# Patient Record
Sex: MALE | Race: WHITE | NOT HISPANIC OR LATINO | URBAN - METROPOLITAN AREA
[De-identification: names, ages, dates, MRNs, and addresses within clinical notes are randomized per-mention and may not be internally consistent; named-entity substitution may affect disease eponyms.]

---

## 2019-10-24 ENCOUNTER — EMERGENCY (EMERGENCY)
Facility: HOSPITAL | Age: 64
LOS: 0 days | Discharge: HOME | End: 2019-10-24
Attending: EMERGENCY MEDICINE | Admitting: EMERGENCY MEDICINE
Payer: MEDICARE

## 2019-10-24 ENCOUNTER — TRANSCRIPTION ENCOUNTER (OUTPATIENT)
Age: 64
End: 2019-10-24

## 2019-10-24 VITALS
OXYGEN SATURATION: 99 % | HEART RATE: 68 BPM | SYSTOLIC BLOOD PRESSURE: 164 MMHG | RESPIRATION RATE: 17 BRPM | TEMPERATURE: 99 F | DIASTOLIC BLOOD PRESSURE: 92 MMHG

## 2019-10-24 VITALS
TEMPERATURE: 97 F | SYSTOLIC BLOOD PRESSURE: 190 MMHG | OXYGEN SATURATION: 97 % | RESPIRATION RATE: 18 BRPM | HEART RATE: 77 BPM | DIASTOLIC BLOOD PRESSURE: 89 MMHG

## 2019-10-24 DIAGNOSIS — S61.211A LACERATION WITHOUT FOREIGN BODY OF LEFT INDEX FINGER WITHOUT DAMAGE TO NAIL, INITIAL ENCOUNTER: ICD-10-CM
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
## 2019-10-24 DIAGNOSIS — M79.646 PAIN IN UNSPECIFIED FINGER(S): ICD-10-CM
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
## 2019-10-24 PROCEDURE — 73140 X-RAY EXAM OF FINGER(S): CPT | Mod: 26,LT

## 2019-10-24 PROCEDURE — 99283 EMERGENCY DEPT VISIT LOW MDM: CPT

## 2019-10-24 RX ORDER — CEPHALEXIN 500 MG
500 CAPSULE ORAL EVERY 12 HOURS
Refills: 0 | Status: DISCONTINUED | OUTPATIENT
Start: 2019-10-24 | End: 2019-10-24

## 2019-10-24 RX ORDER — CEPHALEXIN 500 MG
1 CAPSULE ORAL
Qty: 40 | Refills: 0
Start: 2019-10-24 | End: 2019-11-02

## 2019-10-24 RX ORDER — CEFAZOLIN SODIUM 1 G
2000 VIAL (EA) INJECTION ONCE
Refills: 0 | Status: DISCONTINUED | OUTPATIENT
Start: 2019-10-24 | End: 2019-10-24

## 2019-10-24 RX ADMIN — Medication 500 MILLIGRAM(S): at 21:40

## 2019-10-24 NOTE — ED PROVIDER NOTE - PHYSICAL EXAMINATION
CONST: Well appearing in NAD  NECK: supple  CARD: Normal S1 S2; Normal rate and rhythm  RESP: Equal BS B/L, No wheezes, rhonchi or rales. No distress  MS: +left index finger laceration overlying DIP joint involving nail bed. +left middle finger laceration to dorsal aspect proximal to DIP joint, no active bleeding. Neurovascular intact. Cap refill <2 seconds. FROM of digits, wrist, elbow and shoulder bilaterally

## 2019-10-24 NOTE — ED PROVIDER NOTE - NS ED ROS FT
Review of Systems:  	•	CONSTITUTIONAL - no fever, no diaphoresis, no chills  	•	RESPIRATORY - no shortness of breath, no cough  	•	CARDIAC - no chest pain, no palpitations  	•	MUSCULOSKELETAL - +left index finger laceration with active bleeding   	•	NEUROLOGIC - no weakness, no paresthesias

## 2019-10-24 NOTE — ED PROVIDER NOTE - OBJECTIVE STATEMENT
64 year old male, no pmh, sent in from urgent care with laceration to left index finger 5 hours PTA. Patient was using a jig saw when he sliced his left index finger and left middle finger. Not on any blood thinners. Denies tingling, numbness, weakness to the fingers. Tetanus UTD. 64 year old male, no pmh, sent in from urgent care for eval/tx of complex laceration to left index finger 5 hours PTA. Patient was using a jig saw when he sliced his left index finger and left middle finger. Not on any blood thinners. Denies tingling, numbness, weakness to the fingers. Tetanus UTD.  Denies fever/chill/HA/dizziness/chest pain/palpitation/sob/abd pain/n/v/d/ black stool/bloody stool/urinary sxs

## 2019-10-24 NOTE — ED PROVIDER NOTE - CARE PROVIDER_API CALL
Jamie Barfield (DO)  Plastic Surgery  [address] [place] [address]  Phone: [phone]  Fax: [phone]  Follow Up Time:

## 2019-10-24 NOTE — ED PROVIDER NOTE - NSFOLLOWUPINSTRUCTIONS_ED_ALL_ED_FT
CALL THE SPECIALIST BELOW TOMORROW TO ARRANGE FOLLOWUP IN 1-2 DAYS    Laceration    WHAT YOU NEED TO KNOW:    A laceration is an injury to the skin and the soft tissue underneath it. Lacerations happen when you are cut or hit by something. They can happen anywhere on the body.     DISCHARGE INSTRUCTIONS:    Return to the emergency department if:     You have heavy bleeding or bleeding that does not stop after 10 minutes of holding firm, direct pressure over the wound.       Your wound opens up.     Contact your healthcare provider if:     You have a fever or chills.       Your laceration is red, warm, or swollen.      You have red streaks on your skin coming from your wound.      You have white or yellow drainage from the wound that smells bad.      You have pain that gets worse, even after treatment.       You have questions or concerns about your condition or care.     Medicines:     Prescription pain medicine may be given. Ask how to take this medicine safely.       Antibiotics help treat or prevent a bacterial infection.       Take your medicine as directed. Contact your healthcare provider if you think your medicine is not helping or if you have side effects. Tell him or her if you are allergic to any medicine. Keep a list of the medicines, vitamins, and herbs you take. Include the amounts, and when and why you take them. Bring the list or the pill bottles to follow-up visits. Carry your medicine list with you in case of an emergency.    Care for your wound as directed:     Do not get your wound wet until your healthcare provider says it is okay. Do not soak your wound in water. Do not go swimming until your healthcare provider says it is okay. Carefully wash the wound with soap and water. Gently pat the area dry or allow it to air dry.       Change your bandages when they get wet, dirty, or after washing. Apply new, clean bandages as directed. Do not apply elastic bandages or tape too tight. Do not put powders or lotions over your incision.       Apply antibiotic ointment as directed. Your healthcare provider may give you antibiotic ointment to put over your wound if you have stitches. If you have strips of tape over your incision, let them dry up and fall off on their own. If they do not fall off within 14 days, gently remove them. If you have glue over your wound, do not remove or pick at it. If your glue comes off, do not replace it with glue that you have at home.       Check your wound every day for signs of infection such as swelling, redness, or pus.     Self-care:     Apply ice on your wound for 15 to 20 minutes every hour or as directed. Use an ice pack, or put crushed ice in a plastic bag. Cover it with a towel. Ice helps prevent tissue damage and decreases swelling and pain.      Use a splint as directed. A splint will decrease movement and stress on your wound. It may help it heal faster. A splint may be used for lacerations over joints or areas of your body that bend. Ask your healthcare provider how to apply and remove a splint.       Decrease scarring of your wound by applying ointments as directed. Do not apply ointments until your healthcare provider says it is okay. You may need to wait until your wound is healed. Ask which ointment to buy and how often to use it. After your wound is healed, use sunscreen over the area when you are out in the sun. You should do this for at least 6 months to 1 year after your injury.     Follow up with your healthcare provider as directed: You may need to follow up in 24 to 48 hours to have your wound checked for infection. You will need to return in 3 to 14 days if you have stitches or staples so they can be removed. Care for your wound as directed to prevent infection and help it heal. Write down your questions so you remember to ask them during your visits.       © Copyright [employer] 2019 All illustrations and images included in CareNotes are the copyrighted property of [email or web address] or [employer].

## 2019-10-24 NOTE — ED PROVIDER NOTE - NOTES
d/w , on call for hand, and advised superficial closure, abx and d/c home to f/u with him as outpt  surgery team to come to ED for repair, digital block performed by ED as per surgery request; pt and family agree with plan and understand this is not definitive tx and must f/u with specialist

## 2019-10-24 NOTE — ED PROVIDER NOTE - PROGRESS NOTE DETAILS
Spoke with Dr Garcia and surgery team. Patient to have superficial closure by team and follow-up with ortho this week. Spoke with surgery. Requesting digital block, will perform lac repair in next 5 minutes. Spoke with Dr Garcia and surgery team. Patient to have superficial closure by team and follow-up this week. Discussed possible side effects of abx. including but not limited to cdiff/resistance/GI upset. if intolerance, dc use and return to office . Also if there is no improvement, return for re-evaluation. advised to take probiotics. Pt aware that we will have official radiology read pending, and may result in change of xray read.  Pt voices understanding of use of medications, instructions for care, and reasons to return or to go to ED. Discussed rest, ice, compression, and elevation with patient.   Discussed NSAIDs for anti-inflammatory and pain relief.  Patient advised to f/u with hand 65 y/o M with no PMHx presents with laceration to L index finger from a jigsaw approx 5 hours PTA. No weakness, numbness, and his tetanus is utd. I agree to documented exam. Pt with complex finger laceration and was repaired by surgical resident. Pt will get abx and have outpt follow up.

## 2019-10-24 NOTE — CONSULT NOTE ADULT - SUBJECTIVE AND OBJECTIVE BOX
HPI: 64 year old male with no known pmhx presents to ED after he sustained a laceration to the LUE second digit while using a jig saw.     PAST MEDICAL & SURGICAL HISTORY: denies    ALLERGIES: No Known Allergies    HOME MEDICATIONS:  denies    CURRENT MEDICATIONS  MEDICATIONS (STANDING): cephalexin 500 milliGRAM(s) Oral every 12 hours    MEDICATIONS (PRN):  --------------------------------------------------------------------------------------------    Vitals:   T(C): 37.2 (10-24-19 @ 20:41), Max: 37.2 (10-24-19 @ 20:41)  HR: 68 (10-24-19 @ 20:41) (68 - 77)  BP: 164/92 (10-24-19 @ 20:41) (164/92 - 190/89)  RR: 17 (10-24-19 @ 20:41) (17 - 18)  SpO2: 99% (10-24-19 @ 20:41) (97% - 99%)    PHYSICAL EXAM:  GENERAL: NAD,   HEAD:  Atraumatic, Normocephalic  EXTREMITIES: LUE second digit laceration at the distal end of the digit  PSYCH: AAOx3  ------------------------------------------------------------------------------------------    LABS    NONE  --------------------------------------------------------------------------------------------    IMAGING:  LUE hand xray: No obvious foreign bodies noted unofficial read) HPI: 64 year old male with pmhx of colitis presents to ED after he sustained a laceration to the LUE second and third digits while using a jig saw. Denies any numbness or tingling in the extremity     PAST MEDICAL & SURGICAL HISTORY: colitis    ALLERGIES: No Known Allergies    HOME MEDICATIONS:  denies    CURRENT MEDICATIONS  MEDICATIONS (STANDING): cephalexin 500 milliGRAM(s) Oral every 12 hours    MEDICATIONS (PRN):  --------------------------------------------------------------------------------------------    Vitals:   T(C): 37.2 (10-24-19 @ 20:41), Max: 37.2 (10-24-19 @ 20:41)  HR: 68 (10-24-19 @ 20:41) (68 - 77)  BP: 164/92 (10-24-19 @ 20:41) (164/92 - 190/89)  RR: 17 (10-24-19 @ 20:41) (17 - 18)  SpO2: 99% (10-24-19 @ 20:41) (97% - 99%)    PHYSICAL EXAM:  GENERAL: NAD,   HEAD:  Atraumatic, Normocephalic  EXTREMITIES: LUE second digit 2cm laceration at the distal end of the digit, and smaller 0.5cm laceration to the 3rd digit  PSYCH: AAOx3  ------------------------------------------------------------------------------------------    LABS    NONE  --------------------------------------------------------------------------------------------    IMAGING:  LUE hand xray: No obvious foreign bodies noted unofficial read)

## 2019-10-24 NOTE — CONSULT NOTE ADULT - ASSESSMENT
ASSESSMENT: 64 year old male with no known pmhx presents to ED after he sustained a laceration to the LUE second digit while using a jig saw.     PLAN:   Laceration to be repaired    Discussed with Dr Solis and Dr Barfield ASSESSMENT: 64 year old male with no known pmhx presents to ED after he sustained a laceration to the LUE second digit while using a jig saw. Laceration was repaired in the ED    PLAN:   Pain control,  Outpatient follow up with Dr Connors    Discussed with Dr Solis and Dr Barfield ASSESSMENT: 64 year old male with no known pmhx presents to ED after he sustained a laceration to the LUE second digit while using a jig saw. Laceration was repaired in the ED    PLAN:   Pain control,  Outpatient follow up with Dr Connors    Discussed with Dr Solis and Dr Barfield    Surgery Senior note:  Patient with left finger laceration  Plan for digital block and repair of laceration  Discussed with Dr. Barfield

## 2019-10-24 NOTE — ED PROVIDER NOTE - PATIENT PORTAL LINK FT
You can access the FollowMyHealth Patient Portal offered by [facility] by registering at the following website: [email or web address]. By joining [employer]’s FollowMyHealth portal, you will also be able to view your health information using other applications (apps) compatible with our system.

## 2019-10-25 NOTE — ED POST DISCHARGE NOTE - RESULT SUMMARY
foreighn body noted on xray. pt given abx. I called Dr. Barfield and pt is following up next week with him and he will keep this information in mind

## 2021-07-06 ENCOUNTER — OUTPATIENT (OUTPATIENT)
Dept: OUTPATIENT SERVICES | Facility: HOSPITAL | Age: 66
LOS: 1 days | Discharge: HOME | End: 2021-07-06
Payer: MEDICARE

## 2021-07-06 VITALS
TEMPERATURE: 97 F | SYSTOLIC BLOOD PRESSURE: 148 MMHG | HEART RATE: 60 BPM | WEIGHT: 171.08 LBS | RESPIRATION RATE: 18 BRPM | HEIGHT: 67 IN | DIASTOLIC BLOOD PRESSURE: 75 MMHG | OXYGEN SATURATION: 97 %

## 2021-07-06 DIAGNOSIS — M25.561 PAIN IN RIGHT KNEE: ICD-10-CM

## 2021-07-06 DIAGNOSIS — Z01.818 ENCOUNTER FOR OTHER PREPROCEDURAL EXAMINATION: ICD-10-CM

## 2021-07-06 DIAGNOSIS — Z98.890 OTHER SPECIFIED POSTPROCEDURAL STATES: Chronic | ICD-10-CM

## 2021-07-06 LAB
ALBUMIN SERPL ELPH-MCNC: 4.3 G/DL — SIGNIFICANT CHANGE UP (ref 3.5–5.2)
ALP SERPL-CCNC: 80 U/L — SIGNIFICANT CHANGE UP (ref 30–115)
ALT FLD-CCNC: 12 U/L — SIGNIFICANT CHANGE UP (ref 0–41)
ANION GAP SERPL CALC-SCNC: 10 MMOL/L — SIGNIFICANT CHANGE UP (ref 7–14)
AST SERPL-CCNC: 16 U/L — SIGNIFICANT CHANGE UP (ref 0–41)
BASOPHILS # BLD AUTO: 0.04 K/UL — SIGNIFICANT CHANGE UP (ref 0–0.2)
BASOPHILS NFR BLD AUTO: 0.9 % — SIGNIFICANT CHANGE UP (ref 0–1)
BILIRUB SERPL-MCNC: 0.3 MG/DL — SIGNIFICANT CHANGE UP (ref 0.2–1.2)
BUN SERPL-MCNC: 22 MG/DL — HIGH (ref 10–20)
CALCIUM SERPL-MCNC: 9.4 MG/DL — SIGNIFICANT CHANGE UP (ref 8.5–10.1)
CHLORIDE SERPL-SCNC: 105 MMOL/L — SIGNIFICANT CHANGE UP (ref 98–110)
CO2 SERPL-SCNC: 26 MMOL/L — SIGNIFICANT CHANGE UP (ref 17–32)
CREAT SERPL-MCNC: 1.1 MG/DL — SIGNIFICANT CHANGE UP (ref 0.7–1.5)
EOSINOPHIL # BLD AUTO: 0.19 K/UL — SIGNIFICANT CHANGE UP (ref 0–0.7)
EOSINOPHIL NFR BLD AUTO: 4.5 % — SIGNIFICANT CHANGE UP (ref 0–8)
GLUCOSE SERPL-MCNC: 84 MG/DL — SIGNIFICANT CHANGE UP (ref 70–99)
HCT VFR BLD CALC: 43.1 % — SIGNIFICANT CHANGE UP (ref 42–52)
HGB BLD-MCNC: 14.5 G/DL — SIGNIFICANT CHANGE UP (ref 14–18)
IMM GRANULOCYTES NFR BLD AUTO: 0.2 % — SIGNIFICANT CHANGE UP (ref 0.1–0.3)
LYMPHOCYTES # BLD AUTO: 1.22 K/UL — SIGNIFICANT CHANGE UP (ref 1.2–3.4)
LYMPHOCYTES # BLD AUTO: 28.6 % — SIGNIFICANT CHANGE UP (ref 20.5–51.1)
MCHC RBC-ENTMCNC: 28.6 PG — SIGNIFICANT CHANGE UP (ref 27–31)
MCHC RBC-ENTMCNC: 33.6 G/DL — SIGNIFICANT CHANGE UP (ref 32–37)
MCV RBC AUTO: 85 FL — SIGNIFICANT CHANGE UP (ref 80–94)
MONOCYTES # BLD AUTO: 0.38 K/UL — SIGNIFICANT CHANGE UP (ref 0.1–0.6)
MONOCYTES NFR BLD AUTO: 8.9 % — SIGNIFICANT CHANGE UP (ref 1.7–9.3)
NEUTROPHILS # BLD AUTO: 2.42 K/UL — SIGNIFICANT CHANGE UP (ref 1.4–6.5)
NEUTROPHILS NFR BLD AUTO: 56.9 % — SIGNIFICANT CHANGE UP (ref 42.2–75.2)
NRBC # BLD: 0 /100 WBCS — SIGNIFICANT CHANGE UP (ref 0–0)
PLATELET # BLD AUTO: 222 K/UL — SIGNIFICANT CHANGE UP (ref 130–400)
POTASSIUM SERPL-MCNC: 4.1 MMOL/L — SIGNIFICANT CHANGE UP (ref 3.5–5)
POTASSIUM SERPL-SCNC: 4.1 MMOL/L — SIGNIFICANT CHANGE UP (ref 3.5–5)
PROT SERPL-MCNC: 7.1 G/DL — SIGNIFICANT CHANGE UP (ref 6–8)
RBC # BLD: 5.07 M/UL — SIGNIFICANT CHANGE UP (ref 4.7–6.1)
RBC # FLD: 12.6 % — SIGNIFICANT CHANGE UP (ref 11.5–14.5)
SODIUM SERPL-SCNC: 141 MMOL/L — SIGNIFICANT CHANGE UP (ref 135–146)
WBC # BLD: 4.26 K/UL — LOW (ref 4.8–10.8)
WBC # FLD AUTO: 4.26 K/UL — LOW (ref 4.8–10.8)

## 2021-07-06 PROCEDURE — 71046 X-RAY EXAM CHEST 2 VIEWS: CPT | Mod: 26

## 2021-07-06 PROCEDURE — 93010 ELECTROCARDIOGRAM REPORT: CPT

## 2021-07-06 NOTE — H&P PST ADULT - REASON FOR ADMISSION
65 Y/O M SCHEDULED FOR PAST FOR RIGHT KNEE ARTHROSCOPY MEDIAL AND LATERAL MENISCECTOMY ON 7/26/21- had an injury 40 yrs ago but never sought treatment. Since being retired his right knee has been increasing in pain especially when walking

## 2021-07-06 NOTE — H&P PST ADULT - HISTORY OF PRESENT ILLNESS
CURRENTLY  DENIES ANY CP, SOB,PALPITATIONS,COUGH OR DYSURIA  EXERCISE TOLERANCE 2 FOS WITHOUT SOB    AS PER PATIENT  this is his/her complete medical history including medications - PRESCRIPTIONS  OVER THE COUNTER MEDS    pt denies any covid s/s, or tested positive in the past.  has not Received covid vaccine  pt advised self quarantine till day of procedure    Anesthesia Alert  NO--Difficult Airway  NO--History of neck surgery or radiation  NO--Limited ROM of neck  NO--History of Malignant hyperthermia  NO--No personal or family history of Pseudocholinesterase deficiency.  NO--Prior Anesthesia Complication  NO--Latex Allergy  NO--Loose teeth  NO--History of Rheumatoid Arthritis  NO--SHIVANI  NO--Bleeding risk  NO--Other_____

## 2021-07-23 ENCOUNTER — OUTPATIENT (OUTPATIENT)
Dept: OUTPATIENT SERVICES | Facility: HOSPITAL | Age: 66
LOS: 1 days | Discharge: HOME | End: 2021-07-23

## 2021-07-23 DIAGNOSIS — Z11.59 ENCOUNTER FOR SCREENING FOR OTHER VIRAL DISEASES: ICD-10-CM

## 2021-07-23 DIAGNOSIS — Z98.890 OTHER SPECIFIED POSTPROCEDURAL STATES: Chronic | ICD-10-CM

## 2021-07-23 PROBLEM — Z87.19 PERSONAL HISTORY OF OTHER DISEASES OF THE DIGESTIVE SYSTEM: Chronic | Status: ACTIVE | Noted: 2021-07-06

## 2021-07-26 ENCOUNTER — OUTPATIENT (OUTPATIENT)
Dept: OUTPATIENT SERVICES | Facility: HOSPITAL | Age: 66
LOS: 1 days | Discharge: HOME | End: 2021-07-26
Payer: MEDICARE

## 2021-07-26 VITALS
SYSTOLIC BLOOD PRESSURE: 123 MMHG | RESPIRATION RATE: 19 BRPM | DIASTOLIC BLOOD PRESSURE: 66 MMHG | OXYGEN SATURATION: 99 % | HEART RATE: 60 BPM

## 2021-07-26 VITALS
HEIGHT: 67 IN | WEIGHT: 171.08 LBS | OXYGEN SATURATION: 97 % | SYSTOLIC BLOOD PRESSURE: 125 MMHG | RESPIRATION RATE: 18 BRPM | TEMPERATURE: 98 F | HEART RATE: 58 BPM | DIASTOLIC BLOOD PRESSURE: 78 MMHG

## 2021-07-26 DIAGNOSIS — S83.249A OTHER TEAR OF MEDIAL MENISCUS, CURRENT INJURY, UNSPECIFIED KNEE, INITIAL ENCOUNTER: ICD-10-CM

## 2021-07-26 DIAGNOSIS — Z98.890 OTHER SPECIFIED POSTPROCEDURAL STATES: Chronic | ICD-10-CM

## 2021-07-26 PROCEDURE — 88304 TISSUE EXAM BY PATHOLOGIST: CPT | Mod: 26

## 2021-07-26 RX ORDER — SODIUM CHLORIDE 9 MG/ML
1000 INJECTION, SOLUTION INTRAVENOUS
Refills: 0 | Status: DISCONTINUED | OUTPATIENT
Start: 2021-07-26 | End: 2021-08-09

## 2021-07-26 RX ORDER — METOCLOPRAMIDE HCL 10 MG
10 TABLET ORAL ONCE
Refills: 0 | Status: DISCONTINUED | OUTPATIENT
Start: 2021-07-26 | End: 2021-08-09

## 2021-07-26 RX ORDER — DEXAMETHASONE 0.5 MG/5ML
8 ELIXIR ORAL ONCE
Refills: 0 | Status: DISCONTINUED | OUTPATIENT
Start: 2021-07-26 | End: 2021-08-09

## 2021-07-26 RX ORDER — HYDROMORPHONE HYDROCHLORIDE 2 MG/ML
0.5 INJECTION INTRAMUSCULAR; INTRAVENOUS; SUBCUTANEOUS
Refills: 0 | Status: DISCONTINUED | OUTPATIENT
Start: 2021-07-26 | End: 2021-07-26

## 2021-07-26 RX ORDER — ONDANSETRON 8 MG/1
4 TABLET, FILM COATED ORAL ONCE
Refills: 0 | Status: DISCONTINUED | OUTPATIENT
Start: 2021-07-26 | End: 2021-08-09

## 2021-07-26 RX ORDER — OXYCODONE HYDROCHLORIDE 5 MG/1
5 TABLET ORAL ONCE
Refills: 0 | Status: DISCONTINUED | OUTPATIENT
Start: 2021-07-26 | End: 2021-07-26

## 2021-07-26 RX ADMIN — SODIUM CHLORIDE 100 MILLILITER(S): 9 INJECTION, SOLUTION INTRAVENOUS at 15:42

## 2021-07-26 NOTE — CHART NOTE - NSCHARTNOTEFT_GEN_A_CORE
PACU ANESTHESIA ADMISSION NOTE      Procedure: Arthroscopic meniscectomy, medial and lateral    Chondroplasty, knee    Synovectomy, knee, major, arthroscopic      Post op diagnosis:  MMT (medial meniscus tear)        ____  Intubated  TV:______       Rate: ______      FiO2: ______    _x__  Patent Airway    __x__  Full return of protective reflexes    __x__  Full recovery from anesthesia / back to baseline     Vitals:   T:   97.4F        R:   16               BP:    143/73              Sat:     99              P: 53      Mental Status:  __x__ Awake   ___x__ Alert   _____ Drowsy   _____ Sedated    Nausea/Vomiting:  _x___ NO  ______Yes,   See Post - Op Orders          Pain Scale (0-10):  __0/10___    Treatment: ____ None    __x__ See Post - Op/PCA Orders    Post - Operative Fluids:   ____ Oral   __x__ See Post - Op Orders    Plan: Discharge:   __x__Home       _____Floor     _____Critical Care    _____  Other:_________________    Comments: Pt tolerated procedure well, no anesthesia related complications. Care of pt endorsed to PACU, report given to PACU RN. Discharge when criteria are met.

## 2021-07-26 NOTE — ASU DISCHARGE PLAN (ADULT/PEDIATRIC) - ASU DC SPECIAL INSTRUCTIONSFT
Post Operative Instructions for Knee Surgery    Your Surgery Included  [x ] Menisectomy  [ ] Meniscus Repair					  [x ] Synovectomy/Plica Excision	  [x ] Debridement/Chondroplasty  [ ] Lysis of Adhesions  [ ] ACL reconstruction			  [ ] PCL Reconstruction  [ ] Other:  	  Call our office ([phone]) immediately if you experience any of the following:  •	Excessive bleeding or pus like drainage at the incision site  •	Uncontrollable pain not relieved by pain medication  •	Excessive swelling or redness at the incision site  •	Fever above 101.5 degrees not controlled with Tylenol or Motrin  •	Shortness of Breath  •	Any foul odor or blistering from the surgery site    Pain Management: You were given one or more prescriptions before leaving the hospital. Have the prescriptions filled at a pharmacy on your way home and follow the instructions on the bottles.   Regional Anesthesia Injections (Blocks): You may have been given a regional nerve block either before or after surgery. This may numb your leg for 24-36 hours  	*Proceed with caution when weight bearing on your leg    Diet: Eat a bland diet for the first day after surgery. Progress your diet as tolerated. Constipation may occur with Narcotic usage, contact our office if you are experiencing constipation.    Activity: Limit your activity during the first 48 hours, keep your leg elevated with pillows under your heel. After the first 48 hours at home, increase your activity level based on your symptoms.    Dressing Change: Remove the dressing on the 3rd day. It is normal for some blood to be seen on the dressings. It is also normal for you to see apparent bruising on the skin around your knee when you remove the dressing. If present, leave the steri-strip tape across the incisions. If you are concerned by the drainage or the appearance of your knee, please call one of the numbers listed. Keep covered with Band-Aids/bandages.    Showering: You may shower on the 5th day after surgery if the wound is dry and clean, but do not let the wound soak in water until sutures are removed. Do not submerge in any water until after your postoperative appointment in clinic.    Weight Bearing:						  [x ] Weight bearing as tolerated		  [ ] Nonweight bearing				  [ ] Other:						    Operative Knee Range of Motion  [x ] Full range of motion  [ ] ROM 0-90 deg  [ ] Other:    Knee Exercises: You may do these exercises for 2-5 mins five times a day in order to help regain your range of motion.  [x ] Quad Sets: Begin activating your quadriceps muscle by driving your knee downward into full knee extension while seated on a table or bed   with a towel rolled and propped under your heel  [x ] Straight Leg Raise: While michele your quadriceps muscle, lift your fully extended leg to the level of your non-operative knee  [x ] Heel Slides: With the knee straight, slide your heel slowly toward your buttocks, hold at the endpoint for 10-15 seconds, then slowly straighten  [x ] Ankle Pumps: With your knee straight, move your ankle in a "pumping"  fashion to activate your calf and leg muscle     Follow Up: As Scheduled

## 2021-07-26 NOTE — BRIEF OPERATIVE NOTE - NSICDXBRIEFPROCEDURE_GEN_ALL_CORE_FT
PROCEDURES:  Arthroscopic meniscectomy, medial and lateral 26-Jul-2021 14:00:48  Jagdeep Keller  Chondroplasty, knee 26-Jul-2021 14:01:34  Jagdeep Keller  Synovectomy, knee, major, arthroscopic 26-Jul-2021 14:01:41  Jagdeep Keller

## 2021-07-28 LAB — SURGICAL PATHOLOGY STUDY: SIGNIFICANT CHANGE UP

## 2021-08-03 DIAGNOSIS — M65.861 OTHER SYNOVITIS AND TENOSYNOVITIS, RIGHT LOWER LEG: ICD-10-CM

## 2021-08-03 DIAGNOSIS — M23.251 DERANGEMENT OF POSTERIOR HORN OF LATERAL MENISCUS DUE TO OLD TEAR OR INJURY, RIGHT KNEE: ICD-10-CM

## 2021-08-03 DIAGNOSIS — M23.221 DERANGEMENT OF POSTERIOR HORN OF MEDIAL MENISCUS DUE TO OLD TEAR OR INJURY, RIGHT KNEE: ICD-10-CM

## 2021-08-03 DIAGNOSIS — M94.261 CHONDROMALACIA, RIGHT KNEE: ICD-10-CM

## 2021-08-03 DIAGNOSIS — Z87.891 PERSONAL HISTORY OF NICOTINE DEPENDENCE: ICD-10-CM

## 2022-05-24 NOTE — H&P PST ADULT - CONSTITUTIONAL DETAILS
Patient presents for B12 injection today and possible transfusion.     HGB (g/dL)   Date Value   05/24/2022 7.1 (L)     Absolute Neutrophil 1.8 - 7.7 K/mcL 0.5 Low       Pt provided education on neutropenia and neutropenic precautions.     Per patient's Hgb transfuse parameters, pt will receive a blood transfusion today at the [facility]. Pt updated on the appointment time and agrees to the plan.     Patient's labs reviewed with JACKELINE Cates. No new orders received. Pt to receive a blood transfusion today and follow-up in clinic as scheduled.     I am an RN. Does the patient have an active anti-cancer treatment plan? (oral, injection, or IV) No.    Pre-Injection Information: Allergies reviewed as required for injection type., Pt states feeling well, no complaints. and Pt denies signs and symptoms of infection.    Refer to MAR (medication administration record) for type of injection and medication given.  Needle Size: 25 g. 1\"  Patient tolerated well: Stable and Follow up appointment scheduled     JACKELINE Cates is supervising clinician today.                well-groomed/no distress

## 2022-10-03 NOTE — ASU PATIENT PROFILE, ADULT - INTERNATIONAL TRAVEL
Dr. Becerril increased the pt to 300 mg at the last OV on 9/28. [facility] sent over a new rx for cosentyx 300 mg to [facility] on 9/29. Notified pharmacist Cayla at [facility]   No

## 2023-03-27 PROBLEM — Z00.00 ENCOUNTER FOR PREVENTIVE HEALTH EXAMINATION: Status: ACTIVE | Noted: 2023-03-27

## 2023-04-18 ENCOUNTER — APPOINTMENT (OUTPATIENT)
Dept: ORTHOPEDIC SURGERY | Facility: CLINIC | Age: 68
End: 2023-04-18
Payer: MEDICARE

## 2023-04-18 PROCEDURE — 99214 OFFICE O/P EST MOD 30 MIN: CPT | Mod: 25

## 2023-04-18 PROCEDURE — 73562 X-RAY EXAM OF KNEE 3: CPT | Mod: RT

## 2023-04-18 PROCEDURE — 20611 DRAIN/INJ JOINT/BURSA W/US: CPT | Mod: RT

## 2023-04-18 NOTE — HISTORY OF PRESENT ILLNESS
[de-identified] : 07/26/2021  Surgery - The patient was taken to the OR today. 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 Right knee arthroscopic medial and lateral meniscectomy (CPT Code: 08755.59).\par 2. Right knee arthroscopic synovectomy (CPT Code: 24788.59).\par 3. Right knee arthroscopic chondroplasty of the medial, lateral, and patellofemoral compartments (CPT Code: 43407.59).\par \par Patient here for evaluation right knee pain.\par Right knee pain has returned\par \par NAD\par Right knee\par No skin breakdown\par Tricompartmental TTP\par Positive patella grind\par PF crepitus\par Negative lachman\par Negative varus/valgus instability\par ROM 0-110\par Pain with forced extension and flexion\par NVI\par Compartments soft and NT\par \par Xray reviewed and significant for right knee arthritis, medial comp\par \par Plan\par went over findings\par explained the xray\par explained oa\par due to paiun, left knee injection\par fu in 2-3 months, if no improvement will order visco\par \par Large Joint Injection was performed because of pain/rom. Anesthesia: ethyl chloride sprayed topically.\par Dexamethasone 2 cc of 4mg.  \par Lidocaine: 2 cc of 1% \par Medication was injected in the right knee. Patient has tried OTC's including aspirin, Ibuprofen, Aleve etc or prescription NSAIDS, and/or exercises at home and/ or physical therapy without satisfactory response. After verbal consent using sterile preparation and technique. The risks, benefits, and alternatives to cortisone injection were explained in full to the patient. Risks outlined include but are not limited to infection, sepsis, bleeding, scarring, skin discoloration, temporary increase in pain, syncopal episode, failure to resolve symptoms, allergic reaction, symptom recurrence, and elevation of blood sugar in diabetics. Patient understood the risks. All questions were answered. After discussion of options, patient requested an injection. Oral informed consent was obtained and sterile prep was done of the injection site. Sterile technique was utilized for the procedure including the preparation of the solutions used for the injection. Patient tolerated the procedure well. Advised to ice the injection site this evening. Prep with alcohol locally to site. Sterile technique used. Diagnostic ultrasound was performed of the knee to confirm.\par \par

## 2023-06-15 ENCOUNTER — APPOINTMENT (OUTPATIENT)
Dept: ORTHOPEDIC SURGERY | Facility: CLINIC | Age: 68
End: 2023-06-15
Payer: MEDICARE

## 2023-06-15 PROCEDURE — 99214 OFFICE O/P EST MOD 30 MIN: CPT

## 2023-06-15 NOTE — HISTORY OF PRESENT ILLNESS
[de-identified] : 07/26/2021  Surgery - The patient was taken to the OR today. 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 Right knee arthroscopic medial and lateral meniscectomy (CPT Code: 33694.59).\par 2. Right knee arthroscopic synovectomy (CPT Code: 30875.59).\par 3. Right knee arthroscopic chondroplasty of the medial, lateral, and patellofemoral compartments (CPT Code: 71475.59).\par \par Patient here for evaluation right knee pain.\par Right knee pain has returned\par \par Had cortisone injection last visit\par \par NAD\par Right knee\par No skin breakdown\par Tricompartmental TTP\par Positive patella grind\par PF crepitus\par Negative lachman\par Negative varus/valgus instability\par ROM 0-110\par Pain with forced extension and flexion\par NVI\par Compartments soft and NT\par \par Xray reviewed and significant for right knee arthritis, medial comp\par \par Plan\par went over findings\par explained the xray\par explained oa and tx options\par since he has done surgery and had cortisone and still in pain, will send auth for right knee visco\par fu pending auth for right knee visco for OA\par \par \par

## 2023-08-04 ENCOUNTER — APPOINTMENT (OUTPATIENT)
Dept: ORTHOPEDIC SURGERY | Facility: CLINIC | Age: 68
End: 2023-08-04

## 2023-09-26 ENCOUNTER — APPOINTMENT (OUTPATIENT)
Dept: ORTHOPEDIC SURGERY | Facility: CLINIC | Age: 68
End: 2023-09-26
Payer: MEDICARE

## 2023-09-26 VITALS — WEIGHT: 164 LBS | BODY MASS INDEX: 25.74 KG/M2 | HEIGHT: 67 IN

## 2023-09-26 DIAGNOSIS — M25.561 PAIN IN RIGHT KNEE: ICD-10-CM

## 2023-09-26 PROCEDURE — 99213 OFFICE O/P EST LOW 20 MIN: CPT

## 2025-09-13 ENCOUNTER — APPOINTMENT (OUTPATIENT)
Dept: ORTHOPEDIC SURGERY | Facility: CLINIC | Age: 70
End: 2025-09-13
Payer: MEDICARE

## 2025-09-13 DIAGNOSIS — M54.16 RADICULOPATHY, LUMBAR REGION: ICD-10-CM

## 2025-09-13 DIAGNOSIS — M25.552 PAIN IN LEFT HIP: ICD-10-CM

## 2025-09-13 PROCEDURE — 99203 OFFICE O/P NEW LOW 30 MIN: CPT

## 2025-09-13 PROCEDURE — 73502 X-RAY EXAM HIP UNI 2-3 VIEWS: CPT

## 2025-09-13 PROCEDURE — 72100 X-RAY EXAM L-S SPINE 2/3 VWS: CPT
